# Patient Record
Sex: MALE | Race: WHITE | NOT HISPANIC OR LATINO | Employment: FULL TIME | URBAN - METROPOLITAN AREA
[De-identification: names, ages, dates, MRNs, and addresses within clinical notes are randomized per-mention and may not be internally consistent; named-entity substitution may affect disease eponyms.]

---

## 2024-06-25 ENCOUNTER — APPOINTMENT (OUTPATIENT)
Dept: PRIMARY CARE | Facility: CLINIC | Age: 34
End: 2024-06-25
Payer: COMMERCIAL

## 2024-06-25 ENCOUNTER — LAB (OUTPATIENT)
Dept: LAB | Facility: LAB | Age: 34
End: 2024-06-25
Payer: COMMERCIAL

## 2024-06-25 VITALS
DIASTOLIC BLOOD PRESSURE: 78 MMHG | HEIGHT: 73 IN | WEIGHT: 260 LBS | SYSTOLIC BLOOD PRESSURE: 128 MMHG | BODY MASS INDEX: 34.46 KG/M2 | OXYGEN SATURATION: 98 % | HEART RATE: 83 BPM

## 2024-06-25 DIAGNOSIS — M79.89 POLYALGIA: ICD-10-CM

## 2024-06-25 DIAGNOSIS — E55.9 VITAMIN D DEFICIENCY: ICD-10-CM

## 2024-06-25 DIAGNOSIS — R73.9 HYPERGLYCEMIA: ICD-10-CM

## 2024-06-25 DIAGNOSIS — Z00.00 ROUTINE ADULT HEALTH MAINTENANCE: ICD-10-CM

## 2024-06-25 DIAGNOSIS — F41.8 ANXIETY WITH DEPRESSION: ICD-10-CM

## 2024-06-25 DIAGNOSIS — L72.0 EPIDERMAL CYST: Primary | ICD-10-CM

## 2024-06-25 PROBLEM — J04.0 ACUTE LARYNGITIS: Status: RESOLVED | Noted: 2024-06-25 | Resolved: 2024-06-25

## 2024-06-25 PROBLEM — M75.82 TENDINITIS OF LEFT ROTATOR CUFF: Status: ACTIVE | Noted: 2024-06-25

## 2024-06-25 PROBLEM — N20.0 KIDNEY STONE ON LEFT SIDE: Status: ACTIVE | Noted: 2024-06-25

## 2024-06-25 PROBLEM — R91.1 LUNG NODULE: Status: ACTIVE | Noted: 2024-06-25

## 2024-06-25 LAB
ALBUMIN SERPL BCP-MCNC: 4.9 G/DL (ref 3.4–5)
ALP SERPL-CCNC: 41 U/L (ref 33–120)
ALT SERPL W P-5'-P-CCNC: 24 U/L (ref 10–52)
ANION GAP SERPL CALC-SCNC: 14 MMOL/L (ref 10–20)
AST SERPL W P-5'-P-CCNC: 18 U/L (ref 9–39)
BASOPHILS # BLD AUTO: 0.06 X10*3/UL (ref 0–0.1)
BASOPHILS NFR BLD AUTO: 0.7 %
BILIRUB SERPL-MCNC: 0.6 MG/DL (ref 0–1.2)
BUN SERPL-MCNC: 14 MG/DL (ref 6–23)
CALCIUM SERPL-MCNC: 10 MG/DL (ref 8.6–10.3)
CHLORIDE SERPL-SCNC: 104 MMOL/L (ref 98–107)
CHOLEST SERPL-MCNC: 159 MG/DL (ref 0–199)
CHOLESTEROL/HDL RATIO: 2.9
CO2 SERPL-SCNC: 27 MMOL/L (ref 21–32)
CREAT SERPL-MCNC: 0.94 MG/DL (ref 0.5–1.3)
EGFRCR SERPLBLD CKD-EPI 2021: >90 ML/MIN/1.73M*2
EOSINOPHIL # BLD AUTO: 0.06 X10*3/UL (ref 0–0.7)
EOSINOPHIL NFR BLD AUTO: 0.7 %
ERYTHROCYTE [DISTWIDTH] IN BLOOD BY AUTOMATED COUNT: 12.4 % (ref 11.5–14.5)
GLUCOSE SERPL-MCNC: 84 MG/DL (ref 74–99)
HCT VFR BLD AUTO: 45.9 % (ref 41–52)
HDLC SERPL-MCNC: 55.7 MG/DL
HGB BLD-MCNC: 15.7 G/DL (ref 13.5–17.5)
IMM GRANULOCYTES # BLD AUTO: 0.02 X10*3/UL (ref 0–0.7)
IMM GRANULOCYTES NFR BLD AUTO: 0.2 % (ref 0–0.9)
LDLC SERPL CALC-MCNC: 78 MG/DL
LYMPHOCYTES # BLD AUTO: 2.61 X10*3/UL (ref 1.2–4.8)
LYMPHOCYTES NFR BLD AUTO: 28.3 %
MCH RBC QN AUTO: 30.4 PG (ref 26–34)
MCHC RBC AUTO-ENTMCNC: 34.2 G/DL (ref 32–36)
MCV RBC AUTO: 89 FL (ref 80–100)
MONOCYTES # BLD AUTO: 0.57 X10*3/UL (ref 0.1–1)
MONOCYTES NFR BLD AUTO: 6.2 %
NEUTROPHILS # BLD AUTO: 5.89 X10*3/UL (ref 1.2–7.7)
NEUTROPHILS NFR BLD AUTO: 63.9 %
NON HDL CHOLESTEROL: 103 MG/DL (ref 0–149)
NRBC BLD-RTO: 0 /100 WBCS (ref 0–0)
PLATELET # BLD AUTO: 302 X10*3/UL (ref 150–450)
POTASSIUM SERPL-SCNC: 4.2 MMOL/L (ref 3.5–5.3)
PROT SERPL-MCNC: 7.3 G/DL (ref 6.4–8.2)
RBC # BLD AUTO: 5.16 X10*6/UL (ref 4.5–5.9)
SODIUM SERPL-SCNC: 141 MMOL/L (ref 136–145)
TRIGL SERPL-MCNC: 125 MG/DL (ref 0–149)
TSH SERPL-ACNC: 2.42 MIU/L (ref 0.44–3.98)
VLDL: 25 MG/DL (ref 0–40)
WBC # BLD AUTO: 9.2 X10*3/UL (ref 4.4–11.3)

## 2024-06-25 PROCEDURE — 83036 HEMOGLOBIN GLYCOSYLATED A1C: CPT

## 2024-06-25 PROCEDURE — 36415 COLL VENOUS BLD VENIPUNCTURE: CPT

## 2024-06-25 PROCEDURE — 1036F TOBACCO NON-USER: CPT | Performed by: NURSE PRACTITIONER

## 2024-06-25 PROCEDURE — 80061 LIPID PANEL: CPT

## 2024-06-25 PROCEDURE — 82306 VITAMIN D 25 HYDROXY: CPT

## 2024-06-25 PROCEDURE — 80053 COMPREHEN METABOLIC PANEL: CPT

## 2024-06-25 PROCEDURE — 99204 OFFICE O/P NEW MOD 45 MIN: CPT | Performed by: NURSE PRACTITIONER

## 2024-06-25 PROCEDURE — 84443 ASSAY THYROID STIM HORMONE: CPT

## 2024-06-25 PROCEDURE — 85025 COMPLETE CBC W/AUTO DIFF WBC: CPT

## 2024-06-25 ASSESSMENT — ANXIETY QUESTIONNAIRES
7. FEELING AFRAID AS IF SOMETHING AWFUL MIGHT HAPPEN: MORE THAN HALF THE DAYS
5. BEING SO RESTLESS THAT IT IS HARD TO SIT STILL: MORE THAN HALF THE DAYS
1. FEELING NERVOUS, ANXIOUS, OR ON EDGE: MORE THAN HALF THE DAYS
2. NOT BEING ABLE TO STOP OR CONTROL WORRYING: NEARLY EVERY DAY
4. TROUBLE RELAXING: MORE THAN HALF THE DAYS
6. BECOMING EASILY ANNOYED OR IRRITABLE: MORE THAN HALF THE DAYS
IF YOU CHECKED OFF ANY PROBLEMS ON THIS QUESTIONNAIRE, HOW DIFFICULT HAVE THESE PROBLEMS MADE IT FOR YOU TO DO YOUR WORK, TAKE CARE OF THINGS AT HOME, OR GET ALONG WITH OTHER PEOPLE: VERY DIFFICULT
3. WORRYING TOO MUCH ABOUT DIFFERENT THINGS: NEARLY EVERY DAY
GAD7 TOTAL SCORE: 16

## 2024-06-25 ASSESSMENT — ENCOUNTER SYMPTOMS
WOUND: 0
WEAKNESS: 0
FATIGUE: 0
DIZZINESS: 0
PALPITATIONS: 0
ABDOMINAL PAIN: 0
NAUSEA: 0
COLOR CHANGE: 0
JOINT SWELLING: 0
DIFFICULTY URINATING: 0
DIARRHEA: 0
TROUBLE SWALLOWING: 0
CONSTIPATION: 0
COUGH: 0
FEVER: 0
MYALGIAS: 0
BRUISES/BLEEDS EASILY: 0
SLEEP DISTURBANCE: 1
CHILLS: 0
BACK PAIN: 1
HEADACHES: 0
ABDOMINAL DISTENTION: 0
ADENOPATHY: 0
ARTHRALGIAS: 1
SEIZURES: 0
SHORTNESS OF BREATH: 0
WHEEZING: 0
EYE PAIN: 0

## 2024-06-25 ASSESSMENT — PATIENT HEALTH QUESTIONNAIRE - PHQ9
9. THOUGHTS THAT YOU WOULD BE BETTER OFF DEAD, OR OF HURTING YOURSELF: NOT AT ALL
5. POOR APPETITE OR OVEREATING: SEVERAL DAYS
SUM OF ALL RESPONSES TO PHQ QUESTIONS 1-9: 15
6. FEELING BAD ABOUT YOURSELF - OR THAT YOU ARE A FAILURE OR HAVE LET YOURSELF OR YOUR FAMILY DOWN: NEARLY EVERY DAY
SUM OF ALL RESPONSES TO PHQ9 QUESTIONS 1 AND 2: 4
1. LITTLE INTEREST OR PLEASURE IN DOING THINGS: NEARLY EVERY DAY
3. TROUBLE FALLING OR STAYING ASLEEP OR SLEEPING TOO MUCH: NEARLY EVERY DAY
6. FEELING BAD ABOUT YOURSELF - OR THAT YOU ARE A FAILURE OR HAVE LET YOURSELF OR YOUR FAMILY DOWN: NEARLY EVERY DAY
10. IF YOU CHECKED OFF ANY PROBLEMS, HOW DIFFICULT HAVE THESE PROBLEMS MADE IT FOR YOU TO DO YOUR WORK, TAKE CARE OF THINGS AT HOME, OR GET ALONG WITH OTHER PEOPLE: SOMEWHAT DIFFICULT
8. MOVING OR SPEAKING SO SLOWLY THAT OTHER PEOPLE COULD HAVE NOTICED. OR THE OPPOSITE, BEING SO FIGETY OR RESTLESS THAT YOU HAVE BEEN MOVING AROUND A LOT MORE THAN USUAL: NOT AT ALL
SUM OF ALL RESPONSES TO PHQ9 QUESTIONS 1 AND 2: 3
7. TROUBLE CONCENTRATING ON THINGS, SUCH AS READING THE NEWSPAPER OR WATCHING TELEVISION: MORE THAN HALF THE DAYS
SUM OF ALL RESPONSES TO PHQ QUESTIONS 1-9: 16
2. FEELING DOWN, DEPRESSED OR HOPELESS: SEVERAL DAYS
3. TROUBLE FALLING OR STAYING ASLEEP OR SLEEPING TOO MUCH: MORE THAN HALF THE DAYS
7. TROUBLE CONCENTRATING ON THINGS, SUCH AS READING THE NEWSPAPER OR WATCHING TELEVISION: NEARLY EVERY DAY
5. POOR APPETITE OR OVEREATING: NOT AT ALL
2. FEELING DOWN, DEPRESSED OR HOPELESS: SEVERAL DAYS
4. FEELING TIRED OR HAVING LITTLE ENERGY: MORE THAN HALF THE DAYS
8. MOVING OR SPEAKING SO SLOWLY THAT OTHER PEOPLE COULD HAVE NOTICED. OR THE OPPOSITE, BEING SO FIGETY OR RESTLESS THAT YOU HAVE BEEN MOVING AROUND A LOT MORE THAN USUAL: MORE THAN HALF THE DAYS
9. THOUGHTS THAT YOU WOULD BE BETTER OFF DEAD, OR OF HURTING YOURSELF: NOT AT ALL
1. LITTLE INTEREST OR PLEASURE IN DOING THINGS: MORE THAN HALF THE DAYS
10. IF YOU CHECKED OFF ANY PROBLEMS, HOW DIFFICULT HAVE THESE PROBLEMS MADE IT FOR YOU TO DO YOUR WORK, TAKE CARE OF THINGS AT HOME, OR GET ALONG WITH OTHER PEOPLE: VERY DIFFICULT
4. FEELING TIRED OR HAVING LITTLE ENERGY: NEARLY EVERY DAY

## 2024-06-25 NOTE — ASSESSMENT & PLAN NOTE
Patient declined orthopedic follow-up at this time.  He is to continue to maintain routine follow-up with chiropractic services and exercising in the gym.  Patient to notify provider for any persistent or worsening joint/back concerns

## 2024-06-25 NOTE — PROGRESS NOTES
"Subjective   Patient ID: Aashish Ignacio is a 34 y.o. male who presents for Mass (On forehead by his hair line. Pt states that it started to turn hard about 1 yr ago and now it has gotten bigger).    Patient seen today in order to establish primary care.  Patient seen sitting up in office, in no acute distress.  He is alert, oriented and appears in good spirits.  Patient voicing concern over a \"lump \"on top of his head.  He states that he noticed it approximately 1 year ago when it was about the size of a pimple.  It has grown relatively significantly since then.  He denies any associated pain or discomfort.  No drainage or infection concerns.  Patient has a large lipoma to the his right upper back as well.  He states that he previously had this evaluated and there were no recommendations for removal  as it did not interfere with his day-to-day. Patient agreeable to dermatology referral  Patient also voicing relatively new concerns of anxiety, depression and insomnia.  He states that his mood concerns escalated approximately 7 to 8 months ago.  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  One of his children has cardiac concerns and another is autistic.  Patient feels lately that there has been a lot to manage as of late.  He admits to good support system with his spouse and family but does not want to burden them with his concerns.  Patient denies any thoughts of self-harm or suicidal ideation.  He is against using medications for mood but is agreeable to speak with an office counselor.  Patient admits to drinking 4-6 beers a week and states that he smokes marijuana daily.  Patient does not feel that his marijuana affects his mood or sleep.  He denies any issues with shortness of breath or chest pain.  Melatonin occasionally used for insomnia with minimal effect.  Patient admits to chronic issues with back pain and left shoulder soreness.  Patient works a very physical job " and is unable to rest his joints as needed.  He now works out in the gym routinely and feels like this helps with his shoulder discomfort.  He also follows routinely with chiropractor which helps to alleviate back pain.  No reported issues with appetite or staying hydrated.  Normal bowel movements and urination reported.  Patient currently takes no scheduled medications.  No other acute concerns voiced at this time.           No current outpatient medications on file prior to visit.     No current facility-administered medications on file prior to visit.       Past Medical History:   Diagnosis Date    Acute laryngitis 06/25/2024        Past Surgical History:   Procedure Laterality Date    OTHER SURGICAL HISTORY  03/01/2018    Prior Surgical Procedure Not Done        Family History   Problem Relation Name Age of Onset    Breast cancer Mother      Hodgkin's lymphoma Sister          Review of Systems   Constitutional:  Negative for chills, fatigue and fever.   HENT:  Negative for dental problem and trouble swallowing.    Eyes:  Negative for pain and visual disturbance.   Respiratory:  Negative for cough, shortness of breath and wheezing.    Cardiovascular:  Negative for chest pain, palpitations and leg swelling.   Gastrointestinal:  Negative for abdominal distention, abdominal pain, constipation, diarrhea and nausea.   Endocrine: Negative for cold intolerance and heat intolerance.   Genitourinary:  Negative for difficulty urinating.   Musculoskeletal:  Positive for arthralgias and back pain. Negative for gait problem, joint swelling and myalgias.        Positive for intermittent issues with lower back and left shoulder pain   Skin:  Negative for color change, pallor, rash and wound.        Positive for lump on head and fatty tumor on back   Allergic/Immunologic: Negative for environmental allergies and food allergies.   Neurological:  Negative for dizziness, seizures, weakness and headaches.   Hematological:  Negative  "for adenopathy. Does not bruise/bleed easily.   Psychiatric/Behavioral:  Positive for sleep disturbance. Negative for behavioral problems.         Positive for anxiety/ depression/ insomnia   All other systems reviewed and are negative.      Objective   /78   Pulse 83   Ht 1.854 m (6' 1\")   Wt 118 kg (260 lb)   SpO2 98%   BMI 34.30 kg/m²     Physical Exam  Constitutional:       General: He is not in acute distress.     Appearance: Normal appearance. He is not toxic-appearing.   HENT:      Head: Normocephalic and atraumatic.      Right Ear: Tympanic membrane, ear canal and external ear normal.      Left Ear: Tympanic membrane, ear canal and external ear normal.      Nose: Nose normal.      Mouth/Throat:      Mouth: Mucous membranes are moist.      Pharynx: Oropharynx is clear.   Eyes:      Extraocular Movements: Extraocular movements intact.      Conjunctiva/sclera: Conjunctivae normal.      Pupils: Pupils are equal, round, and reactive to light.   Cardiovascular:      Rate and Rhythm: Normal rate and regular rhythm.      Pulses: Normal pulses.      Heart sounds: Normal heart sounds. No murmur heard.  Pulmonary:      Effort: Pulmonary effort is normal.      Breath sounds: Normal breath sounds. No wheezing.   Abdominal:      General: Bowel sounds are normal.      Palpations: Abdomen is soft.   Musculoskeletal:         General: No swelling. Normal range of motion.      Cervical back: Normal range of motion and neck supple.   Skin:     General: Skin is warm and dry.      Comments: Cyst to scalp/ top of head (approximately the size of a large grape) and large fatty tumor to right upper back (approximately 2 inches in diameter)   Neurological:      General: No focal deficit present.      Mental Status: He is alert and oriented to person, place, and time. Mental status is at baseline.      Cranial Nerves: No cranial nerve deficit.      Motor: No weakness.   Psychiatric:         Mood and Affect: Mood normal.    "      Behavior: Behavior normal.         Thought Content: Thought content normal.         Judgment: Judgment normal.       Assessment/Plan   Problem List Items Addressed This Visit             ICD-10-CM    Epidermal cyst - Primary L72.0     Dermatology referral placed for recommendations regarding removal         Relevant Orders    Referral to Dermatology    Routine adult health maintenance Z00.00     Routine blood work ordered today for monitoring purposes         Relevant Orders    Lipid Panel    Hemoglobin A1C    Comprehensive Metabolic Panel    CBC and Auto Differential    TSH with reflex to Free T4 if abnormal    Vitamin D 25-Hydroxy,Total (for eval of Vitamin D levels)    Anxiety with depression F41.8     Patient declined medication use at this time.  He is agreeable to speak with in the office counselor to discuss coping mechanisms for his anxiety and depression.  Crisis information given to patient at this time.         Relevant Orders    Follow Up In [facility] - [facility]    Polyalgia M79.89     Patient declined orthopedic follow-up at this time.  He is to continue to maintain routine follow-up with chiropractic services and exercising in the gym.  Patient to notify provider for any persistent or worsening joint/back concerns          Other Visit Diagnoses         Codes    Hyperglycemia     R73.9    Relevant Orders    Hemoglobin A1C    Vitamin D deficiency     E55.9    Relevant Orders    Vitamin D 25-Hydroxy,Total (for eval of Vitamin D levels)

## 2024-06-25 NOTE — PATIENT INSTRUCTIONS
Please arrange dermatology follow-up for the cyst on your head    Psychiatric Services Information:   If you are in need of IMMEDIATE CRISIS COUNSELING please call [facility] at [phone] or dial [phone].    [facility]  [phone]    [facility]  [phone]    Please arrange for routine follow-up in one year

## 2024-06-25 NOTE — ASSESSMENT & PLAN NOTE
Patient declined medication use at this time.  He is agreeable to speak with in the office counselor to discuss coping mechanisms for his anxiety and depression.  Crisis information given to patient at this time.

## 2024-06-26 LAB
25(OH)D3 SERPL-MCNC: 43 NG/ML (ref 30–100)
EST. AVERAGE GLUCOSE BLD GHB EST-MCNC: 100 MG/DL
HBA1C MFR BLD: 5.1 %

## 2024-07-11 ENCOUNTER — APPOINTMENT (OUTPATIENT)
Dept: PRIMARY CARE | Facility: CLINIC | Age: 34
End: 2024-07-11
Payer: COMMERCIAL

## 2024-07-18 ENCOUNTER — APPOINTMENT (OUTPATIENT)
Dept: PRIMARY CARE | Facility: CLINIC | Age: 34
End: 2024-07-18
Payer: COMMERCIAL
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
## 2024-07-31 PROCEDURE — 99492 1ST PSYC COLLAB CARE MGMT: CPT | Performed by: NURSE PRACTITIONER

## 2024-08-05 ENCOUNTER — DOCUMENTATION WITH CHARGES (OUTPATIENT)
Dept: PRIMARY CARE | Facility: CLINIC | Age: 34
End: 2024-08-05
Payer: COMMERCIAL

## 2024-08-05 DIAGNOSIS — F41.8 DEPRESSION WITH ANXIETY: Primary | ICD-10-CM

## 2024-08-06 ENCOUNTER — APPOINTMENT (OUTPATIENT)
Dept: PRIMARY CARE | Facility: CLINIC | Age: 34
End: 2024-08-06
Payer: COMMERCIAL

## 2024-09-26 ENCOUNTER — APPOINTMENT (OUTPATIENT)
Dept: DERMATOLOGY | Facility: CLINIC | Age: 34
End: 2024-09-26
Payer: COMMERCIAL

## 2025-02-26 ENCOUNTER — APPOINTMENT (OUTPATIENT)
Dept: DERMATOLOGY | Facility: CLINIC | Age: 35
End: 2025-02-26
Payer: COMMERCIAL

## 2025-03-31 ENCOUNTER — DOCUMENTATION (OUTPATIENT)
Dept: PRIMARY CARE | Facility: CLINIC | Age: 35
End: 2025-03-31
Payer: COMMERCIAL